# Patient Record
Sex: FEMALE | Race: WHITE | NOT HISPANIC OR LATINO | Employment: STUDENT | URBAN - METROPOLITAN AREA
[De-identification: names, ages, dates, MRNs, and addresses within clinical notes are randomized per-mention and may not be internally consistent; named-entity substitution may affect disease eponyms.]

---
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
## 2024-02-27 ENCOUNTER — OFFICE VISIT (OUTPATIENT)
Dept: URGENT CARE | Facility: CLINIC | Age: 19
End: 2024-02-27
Payer: COMMERCIAL

## 2024-02-27 VITALS
TEMPERATURE: 98 F | SYSTOLIC BLOOD PRESSURE: 115 MMHG | WEIGHT: 145 LBS | OXYGEN SATURATION: 97 % | HEIGHT: 65 IN | HEART RATE: 87 BPM | RESPIRATION RATE: 18 BRPM | DIASTOLIC BLOOD PRESSURE: 69 MMHG | BODY MASS INDEX: 24.16 KG/M2

## 2024-02-27 DIAGNOSIS — Z30.41: Primary | ICD-10-CM

## 2024-02-27 DIAGNOSIS — Z30.09 COUNSELING FOR BIRTH CONTROL, ORAL CONTRACEPTIVES: ICD-10-CM

## 2024-02-27 DIAGNOSIS — Z76.0 ENCOUNTER FOR MEDICATION REFILL: ICD-10-CM

## 2024-02-27 PROCEDURE — 99499 UNLISTED E&M SERVICE: CPT | Mod: S$GLB,,, | Performed by: NURSE PRACTITIONER

## 2024-02-27 RX ORDER — DROSPIRENONE AND ETHINYL ESTRADIOL 0.02-3(28)
1 KIT ORAL DAILY
Qty: 90 TABLET | Refills: 0 | Status: SHIPPED | OUTPATIENT
Start: 2024-02-27

## 2024-02-27 NOTE — PROGRESS NOTES
"Subjective:      Patient ID: Bárbara Gomez is a 18 y.o. female.    Vitals:  height is 5' 5" (1.651 m) and weight is 65.8 kg (145 lb). Her temperature is 97.5 °F (36.4 °C). Her blood pressure is 115/69 and her pulse is 87. Her respiration is 18 and oxygen saturation is 97%.     Chief Complaint: Medication Refill    [employer] student presents for ocp drospirenone-ethinyl estradiol refill. Has not missed any doses. Was seen here 9/2023 and prescribed a 90 day supply then was provided an additional 90 day supply in December by [facility]'s pharmacy as an "emergency refill" since she was unable to see a provider.   LMP 2/20/24-2/24/24  Cycles are regular. Is not sexually active. Takes birth control for treatment of acne.          Medication Refill  Pertinent negatives include no abdominal pain, chills, diaphoresis, fatigue, nausea or vomiting.     Constitution: Negative for activity change, appetite change, chills, sweating and fatigue.   Gastrointestinal:  Negative for abdominal pain, abdominal bloating, nausea and vomiting.   Genitourinary:  Negative for dysuria, frequency, urgency, urine decreased, flank pain, painful menstruation, irregular menstruation, missed menses, heavy menstrual bleeding, ovarian cysts, vaginal pain, vaginal discharge, vaginal bleeding, vaginal odor, genital sore and pelvic pain.   Neurological:  Negative for dizziness.    Objective:     Physical Exam   Constitutional: She is oriented to person, place, and time.  Non-toxic appearance. She does not appear ill. No distress.   HENT:   Head: Normocephalic and atraumatic.   Mouth/Throat: Mucous membranes are moist.   Eyes: Conjunctivae are normal.   Cardiovascular: Normal rate, regular rhythm and normal heart sounds.   Pulmonary/Chest: Effort normal and breath sounds normal.   Abdominal: Normal appearance.   Musculoskeletal: Normal range of motion.         General: Normal range of motion.   Neurological: no focal deficit. She is alert and oriented to " person, place, and time.   Skin: Skin is warm, dry and not diaphoretic.   Psychiatric: Her behavior is normal. Mood, judgment and thought content normal.   Nursing note and vitals reviewed.    Assessment:     1. Provided repeat prescription for oral contraceptive    2. Counseling for birth control, oral contraceptives    3. Encounter for medication refill        Plan:       Provided repeat prescription for oral contraceptive  -     drospirenone-ethinyl estradioL (SINCERE) 3-0.02 mg per tablet; Take 1 tablet by mouth once daily.  Dispense: 90 tablet; Refill: 0    Counseling for birth control, oral contraceptives  -     drospirenone-ethinyl estradioL (SINCERE) 3-0.02 mg per tablet; Take 1 tablet by mouth once daily.  Dispense: 90 tablet; Refill: 0    Encounter for medication refill  -     drospirenone-ethinyl estradioL (SINCERE) 3-0.02 mg per tablet; Take 1 tablet by mouth once daily.  Dispense: 90 tablet; Refill: 0        Patient Instructions   Please follow up with your primary care provider within 2-5 days if your signs and symptoms have not resolved or worsen.     If your condition worsens or fails to improve we recommend that you receive another evaluation at the emergency room immediately or contact your primary medical clinic to discuss your concerns.   You must understand that you have received an Urgent Care treatment only and that you may be released before all of your medical problems are known or treated. You, the patient, will arrange for follow up care as instructed.       I refilled your birth control for 90 days. You will need to return to [facility] clinic before you take your last pill to refill your prescription.

## 2024-02-27 NOTE — PATIENT INSTRUCTIONS
Please follow up with your primary care provider within 2-5 days if your signs and symptoms have not resolved or worsen.     If your condition worsens or fails to improve we recommend that you receive another evaluation at the emergency room immediately or contact your primary medical clinic to discuss your concerns.   You must understand that you have received an Urgent Care treatment only and that you may be released before all of your medical problems are known or treated. You, the patient, will arrange for follow up care as instructed.       I refilled your birth control for 90 days. You will need to return to student health clinic before you take your last pill to refill your prescription.